# Patient Record
Sex: MALE | Race: WHITE | HISPANIC OR LATINO | Employment: OTHER | URBAN - METROPOLITAN AREA
[De-identification: names, ages, dates, MRNs, and addresses within clinical notes are randomized per-mention and may not be internally consistent; named-entity substitution may affect disease eponyms.]

---

## 2024-07-23 ENCOUNTER — APPOINTMENT (OUTPATIENT)
Dept: RADIOLOGY | Facility: HOSPITAL | Age: 29
End: 2024-07-23

## 2024-07-23 ENCOUNTER — HOSPITAL ENCOUNTER (EMERGENCY)
Facility: HOSPITAL | Age: 29
Discharge: HOME | End: 2024-07-23
Attending: EMERGENCY MEDICINE | Admitting: EMERGENCY MEDICINE

## 2024-07-23 VITALS
OXYGEN SATURATION: 100 % | HEART RATE: 88 BPM | DIASTOLIC BLOOD PRESSURE: 85 MMHG | WEIGHT: 166 LBS | TEMPERATURE: 98.8 F | HEIGHT: 60 IN | RESPIRATION RATE: 16 BRPM | BODY MASS INDEX: 32.59 KG/M2 | SYSTOLIC BLOOD PRESSURE: 133 MMHG

## 2024-07-23 DIAGNOSIS — R07.9 CHEST PAIN: ICD-10-CM

## 2024-07-23 DIAGNOSIS — R00.2 PALPITATIONS: ICD-10-CM

## 2024-07-23 DIAGNOSIS — F11.90 OPIOID USE DISORDER: ICD-10-CM

## 2024-07-23 LAB
ALBUMIN SERPL BCG-MCNC: 4.7 G/DL (ref 3.5–5.2)
ALP SERPL-CCNC: 85 U/L (ref 40–150)
ALT SERPL W P-5'-P-CCNC: 26 U/L (ref 0–70)
AMPHETAMINES UR QL SCN: ABNORMAL
ANION GAP SERPL CALCULATED.3IONS-SCNC: 11 MMOL/L (ref 7–15)
AST SERPL W P-5'-P-CCNC: 14 U/L (ref 0–45)
ATRIAL RATE - MUSE: 83 BPM
BARBITURATES UR QL SCN: ABNORMAL
BENZODIAZ UR QL SCN: ABNORMAL
BILIRUB DIRECT SERPL-MCNC: 0.21 MG/DL (ref 0–0.3)
BILIRUB SERPL-MCNC: 1.2 MG/DL
BUN SERPL-MCNC: 9.5 MG/DL (ref 6–20)
BZE UR QL SCN: ABNORMAL
CALCIUM SERPL-MCNC: 9.7 MG/DL (ref 8.8–10.4)
CANNABINOIDS UR QL SCN: ABNORMAL
CHLORIDE SERPL-SCNC: 102 MMOL/L (ref 98–107)
CREAT SERPL-MCNC: 0.75 MG/DL (ref 0.67–1.17)
DIASTOLIC BLOOD PRESSURE - MUSE: NORMAL MMHG
EGFRCR SERPLBLD CKD-EPI 2021: >90 ML/MIN/1.73M2
ERYTHROCYTE [DISTWIDTH] IN BLOOD BY AUTOMATED COUNT: 12.4 % (ref 10–15)
FENTANYL UR QL: ABNORMAL
FLUAV RNA SPEC QL NAA+PROBE: NEGATIVE
FLUBV RNA RESP QL NAA+PROBE: NEGATIVE
GLUCOSE SERPL-MCNC: 106 MG/DL (ref 70–99)
HCO3 SERPL-SCNC: 28 MMOL/L (ref 22–29)
HCT VFR BLD AUTO: 44.6 % (ref 40–53)
HGB BLD-MCNC: 14.9 G/DL (ref 13.3–17.7)
INTERPRETATION ECG - MUSE: NORMAL
MCH RBC QN AUTO: 28.3 PG (ref 26.5–33)
MCHC RBC AUTO-ENTMCNC: 33.4 G/DL (ref 31.5–36.5)
MCV RBC AUTO: 85 FL (ref 78–100)
OPIATES UR QL SCN: ABNORMAL
P AXIS - MUSE: 57 DEGREES
PCP QUAL URINE (ROCHE): ABNORMAL
PLATELET # BLD AUTO: 357 10E3/UL (ref 150–450)
POTASSIUM SERPL-SCNC: 4 MMOL/L (ref 3.4–5.3)
PR INTERVAL - MUSE: 136 MS
PROT SERPL-MCNC: 8 G/DL (ref 6.4–8.3)
QRS DURATION - MUSE: 102 MS
QT - MUSE: 382 MS
QTC - MUSE: 448 MS
R AXIS - MUSE: 57 DEGREES
RBC # BLD AUTO: 5.27 10E6/UL (ref 4.4–5.9)
RSV RNA SPEC NAA+PROBE: NEGATIVE
SARS-COV-2 RNA RESP QL NAA+PROBE: NEGATIVE
SODIUM SERPL-SCNC: 141 MMOL/L (ref 135–145)
SYSTOLIC BLOOD PRESSURE - MUSE: NORMAL MMHG
T AXIS - MUSE: 45 DEGREES
T4 FREE SERPL-MCNC: 1.16 NG/DL (ref 0.9–1.7)
TROPONIN T SERPL HS-MCNC: <6 NG/L
TSH SERPL DL<=0.005 MIU/L-ACNC: 0.22 UIU/ML (ref 0.3–4.2)
VENTRICULAR RATE- MUSE: 83 BPM
WBC # BLD AUTO: 11.5 10E3/UL (ref 4–11)

## 2024-07-23 PROCEDURE — 71046 X-RAY EXAM CHEST 2 VIEWS: CPT

## 2024-07-23 PROCEDURE — 93005 ELECTROCARDIOGRAM TRACING: CPT | Performed by: EMERGENCY MEDICINE

## 2024-07-23 PROCEDURE — 80307 DRUG TEST PRSMV CHEM ANLYZR: CPT | Performed by: EMERGENCY MEDICINE

## 2024-07-23 PROCEDURE — 87637 SARSCOV2&INF A&B&RSV AMP PRB: CPT | Performed by: EMERGENCY MEDICINE

## 2024-07-23 PROCEDURE — 80048 BASIC METABOLIC PNL TOTAL CA: CPT

## 2024-07-23 PROCEDURE — 82040 ASSAY OF SERUM ALBUMIN: CPT | Performed by: EMERGENCY MEDICINE

## 2024-07-23 PROCEDURE — 99285 EMERGENCY DEPT VISIT HI MDM: CPT | Mod: 25

## 2024-07-23 PROCEDURE — 84439 ASSAY OF FREE THYROXINE: CPT

## 2024-07-23 PROCEDURE — 84443 ASSAY THYROID STIM HORMONE: CPT

## 2024-07-23 PROCEDURE — 85027 COMPLETE CBC AUTOMATED: CPT

## 2024-07-23 PROCEDURE — 36415 COLL VENOUS BLD VENIPUNCTURE: CPT

## 2024-07-23 PROCEDURE — 93005 ELECTROCARDIOGRAM TRACING: CPT | Performed by: STUDENT IN AN ORGANIZED HEALTH CARE EDUCATION/TRAINING PROGRAM

## 2024-07-23 PROCEDURE — 84484 ASSAY OF TROPONIN QUANT: CPT

## 2024-07-23 PROCEDURE — 250N000013 HC RX MED GY IP 250 OP 250 PS 637

## 2024-07-23 RX ORDER — HYDROXYZINE HYDROCHLORIDE 25 MG/1
25 TABLET, FILM COATED ORAL ONCE
Status: COMPLETED | OUTPATIENT
Start: 2024-07-23 | End: 2024-07-23

## 2024-07-23 RX ORDER — ONDANSETRON 4 MG/1
4 TABLET, ORALLY DISINTEGRATING ORAL EVERY 6 HOURS PRN
Qty: 12 TABLET | Refills: 0 | Status: SHIPPED | OUTPATIENT
Start: 2024-07-23

## 2024-07-23 RX ORDER — HYDROXYZINE HYDROCHLORIDE 25 MG/1
25 TABLET, FILM COATED ORAL 3 TIMES DAILY PRN
Qty: 15 TABLET | Refills: 0 | Status: SHIPPED | OUTPATIENT
Start: 2024-07-23

## 2024-07-23 RX ADMIN — HYDROXYZINE HYDROCHLORIDE 25 MG: 25 TABLET, FILM COATED ORAL at 09:21

## 2024-07-23 ASSESSMENT — COLUMBIA-SUICIDE SEVERITY RATING SCALE - C-SSRS
6. HAVE YOU EVER DONE ANYTHING, STARTED TO DO ANYTHING, OR PREPARED TO DO ANYTHING TO END YOUR LIFE?: NO
2. HAVE YOU ACTUALLY HAD ANY THOUGHTS OF KILLING YOURSELF IN THE PAST MONTH?: NO
1. IN THE PAST MONTH, HAVE YOU WISHED YOU WERE DEAD OR WISHED YOU COULD GO TO SLEEP AND NOT WAKE UP?: NO

## 2024-07-23 NOTE — ED TRIAGE NOTES
"Patient here with wife, states 2 days ago having anxiety, palpitations, chest pain. Abusing percocet 30, usually every 2-3 hours \"right now I am trying to wait until I am really, really bad, 4 days ago I probable took 17 pills, every 30 mins\", body aches, fever. Chest pain, will do Covid and EKG. Denies SI, HI      Would like help with withdraw of opiates  "

## 2024-07-23 NOTE — ED PROVIDER NOTES
EMERGENCY DEPARTMENT ENCOUNTER      NAME: Silverio Bhakta  AGE: 28 year old male  YOB: 1995  MRN: [id number]  EVALUATION DATE & TIME: No admission date for patient encounter.    PCP: No primary care provider on file.        Chief Complaint   Patient presents with    Panic Attack    Withdrawal         IMPRESSION  1. Opioid use disorder    2. Chest pain    3. Palpitations        PLAN  - Zofran & Atarax for home  - close PCP follow up  - discharge to home    ED COURSE & MEDICAL DECISION MAKING    ED Course as of 07/23/24 1326   Tue Jul 23, 2024   0950 CXR independently reviewed & interpreted by me: no acute cardiopulmonary process.       9:09 I was consulted by Fernanda Andrew PA-C on this patient. I reviewed ED presentation history, assessment, management, plan to this point. Please see ED JUAN note for details.        Briefly, 28yoM who has been using street Percocet presenting to the ED looking to quit; decreasing use recently with mild resulting palpitations & atypical chest pain. Chest pain workup unremarkable for emergent life-threatening etiology; doubt ACS, PE, acute aortic syndrome, pneumothorax, Boerhaave's, tamponade. Blood tests with no anemia, WBC 11, unremarkable LFTs, no KAVYA, no glaring electrolyte abnormality. UDS with THC & fentanyl; otherwise negative. Suspect mild opiate withdrawal. Ok for outpatient management. Patient comfortable with discharge at this time. Return precautions and need for PCP follow up discussed and understood. No further questions at the time of discharge.        This patient involved a high degree of complexity in medical decision making, as significant risks were present and assessed. Recent encounters & results in medical record reviewed by me.    All workup (i.e. any EKG/labs/imaging as per charting below) reviewed and independently interpreted by me. See respective sections for details.      I had a face to face encounter with this patient seen by the [facility] Provider (JUAN). I personally made/approved the management plan and take responsibility for the patient management. I personally saw patient and performed a substantive portion of the visit including all aspects of the medical decision making.    MEDICATIONS GIVEN IN THE EMERGENCY DEPARTMENT  Medications   hydrOXYzine HCl (ATARAX) tablet 25 mg (25 mg Oral $Given 7/23/24 0921)       NEW PRESCRIPTIONS STARTED AT TODAY'S ER VISIT  Discharge Medication List as of 7/23/2024 12:05 PM        START taking these medications    Details   hydrOXYzine HCl (ATARAX) 25 MG tablet Take 1 tablet (25 mg) by mouth 3 times daily as needed for anxiety, Disp-15 tablet, R-0, Local Print      ondansetron (ZOFRAN ODT) 4 MG ODT tab Take 1 tablet (4 mg) by mouth every 6 hours as needed for nausea or vomiting, Disp-12 tablet, R-0, Local Print                 =================================================================      HPI  Use of : N/A      Silverio Bhakta is a 28 year old male with a pertinent history of opiate use who presents to the ED by car for evaluation of chest pain, palpitations, withdrawal.  Patient reports he has been using Percocet over the last few months.  He is buying this off the street.  He has been using several pills a day.  Notes increased use over the weekend when he was taking 5 to 6 pills a day.  2 days ago on Sunday he noticed onset of central chest pain, palpitations, and anxiety.  Yesterday noticed body aches.  Denies abdominal pain, nausea, vomiting, diarrhea, fever, cough.  States that he also used marijuana which she uses only occasionally on Sunday when the symptoms started.  No history of anxiety.  He called around to different detox centers, but he does not have medical insurance so was unable to get a spot anywhere.      --------------- MEDICAL HISTORY ---------------  PAST MEDICAL HISTORY:  Reviewed by me.  No past medical history on file.  There is no problem list on file for this  patient.      PAST SURGICAL HISTORY:  Reviewed by me.  No past surgical history on file.    CURRENT MEDICATIONS:    Reviewed by me.  No current facility-administered medications for this encounter.    Current Outpatient Medications:     hydrOXYzine HCl (ATARAX) 25 MG tablet, Take 1 tablet (25 mg) by mouth 3 times daily as needed for anxiety, Disp: 15 tablet, Rfl: 0    ondansetron (ZOFRAN ODT) 4 MG ODT tab, Take 1 tablet (4 mg) by mouth every 6 hours as needed for nausea or vomiting, Disp: 12 tablet, Rfl: 0    ALLERGIES:  Reviewed by me.  No Known Allergies    FAMILY HISTORY:  Reviewed by me.  No family history on file.    SOCIAL HISTORY:   Reviewed by me.         --------------- PHYSICAL EXAM ---------------  Nursing notes and vitals independently reviewed by me.  VITALS:  Vitals:    07/23/24 0841 07/23/24 1037 07/23/24 1205   BP: (!) 145/86 130/79 133/85   Pulse: 99 84 88   Resp: 20 16    Temp: 98.8  F (37.1  C)     SpO2: 97% 100% 100%   Weight: 75.3 kg (166 lb)     Height: 1.524 m (5')         PHYSICAL EXAM:    General:  alert, interactive, no distress  Eyes:  conjunctivae clear, conjugate gaze  HENT:  atraumatic, nose with no rhinorrhea, oropharynx clear  Neck:  no meningismus  Cardiovascular:  HR 80s during exam, regular rhythm, no murmurs, brisk cap refill  Chest:  no chest wall tenderness  Pulmonary:  no stridor, normal phonation, normal work of breathing, clear lungs bilaterally  Abdomen: soft, nondistended, nontender  :  no CVA tenderness  Back:  no midline spinal tenderness  Musculoskeletal:  no pretibial edema, no calf tenderness. Gross ROM intact to joints of extremities with no obvious deformities.  Skin:  warm, dry, no rash  Neuro:  awake, alert, answers questions appropriately, follows commands, moves all limbs  Psych:  calm, normal affect      --------------- RESULTS ---------------  EKG:    Reviewed and independently interpreted by me.  - NSR at 83bpm, no ST or T wave changes, normal intervals  -  no priors for comparison  My read.    LAB:  Reviewed and independently interpreted by me.  Results for orders placed or performed during the hospital encounter of 07/23/24   Chest XR,  PA & LAT    Impression    IMPRESSION: Normal cardiac and mediastinal contours. The lungs are symmetrically inflated and are clear. No pleural effusion or pneumothorax.     Upper abdomen is unremarkable.     CONCLUSION:   Normal chest.    Symptomatic Influenza A/B, RSV, & SARS-CoV2 PCR (COVID-19) Nasopharyngeal    Specimen: Nasopharyngeal; Swab   Result Value Ref Range    Influenza A PCR Negative Negative    Influenza B PCR Negative Negative    RSV PCR Negative Negative    SARS CoV2 PCR Negative Negative   CBC with platelets   Result Value Ref Range    WBC Count 11.5 (H) 4.0 - 11.0 10e3/uL    RBC Count 5.27 4.40 - 5.90 10e6/uL    Hemoglobin 14.9 13.3 - 17.7 g/dL    Hematocrit 44.6 40.0 - 53.0 %    MCV 85 78 - 100 fL    MCH 28.3 26.5 - 33.0 pg    MCHC 33.4 31.5 - 36.5 g/dL    RDW 12.4 10.0 - 15.0 %    Platelet Count 357 150 - 450 10e3/uL   Basic metabolic panel   Result Value Ref Range    Sodium 141 135 - 145 mmol/L    Potassium 4.0 3.4 - 5.3 mmol/L    Chloride 102 98 - 107 mmol/L    Carbon Dioxide (CO2) 28 22 - 29 mmol/L    Anion Gap 11 7 - 15 mmol/L    Urea Nitrogen 9.5 6.0 - 20.0 mg/dL    Creatinine 0.75 0.67 - 1.17 mg/dL    GFR Estimate >90 >60 mL/min/1.73m2    Calcium 9.7 8.8 - 10.4 mg/dL    Glucose 106 (H) 70 - 99 mg/dL   Result Value Ref Range    Troponin T, High Sensitivity <6 <=22 ng/L   TSH with free T4 reflex   Result Value Ref Range    TSH 0.22 (L) 0.30 - 4.20 uIU/mL   Hepatic function panel   Result Value Ref Range    Protein Total 8.0 6.4 - 8.3 g/dL    Albumin 4.7 3.5 - 5.2 g/dL    Bilirubin Total 1.2 <=1.2 mg/dL    Alkaline Phosphatase 85 40 - 150 U/L    AST 14 0 - 45 U/L    ALT 26 0 - 70 U/L    Bilirubin Direct 0.21 0.00 - 0.30 mg/dL   Result Value Ref Range    Free T4 1.16 0.90 - 1.70 ng/dL   Urine Drug Screen Panel    Result Value Ref Range    Amphetamines Urine Screen Negative Screen Negative    Barbituates Urine Screen Negative Screen Negative    Benzodiazepine Urine Screen Negative Screen Negative    Cannabinoids Urine Screen Positive (A) Screen Negative    Cocaine Urine Screen Negative Screen Negative    Fentanyl Qual Urine Screen Positive (A) Screen Negative    Opiates Urine Screen Negative Screen Negative    PCP Urine Screen Negative Screen Negative       RADIOLOGY:  Reviewed and independently interpreted by me. Please see official radiology report.  Recent Results (from the past 24 hour(s))   Chest XR,  PA & LAT    Narrative    EXAM: XR CHEST 2 VIEWS  LOCATION: [facility]  DATE: 7/23/2024    INDICATION: chest pain  COMPARISON: None.      Impression    IMPRESSION: Normal cardiac and mediastinal contours. The lungs are symmetrically inflated and are clear. No pleural effusion or pneumothorax.     Upper abdomen is unremarkable.     CONCLUSION:   Normal chest.                    --------------- ADDITIONAL MDM ---------------  History:  - I considered systemic symptoms of the presenting illness.  - Supplemental history from:       -- patient  - External Record(s) reviewed:       -- Inpatient/outpatient record, prior labs, prior imaging       -- see above ED course & MDM for further details    Workup:  - Chart documentation above includes differential considered and any EKGs or imaging independently interpreted by provider.  - emergent/severe conditions considered and evaluated for: see above differential & MDM  - In additional to work up documented, I considered the following work up:       -- CTA chest/abdomen/pelvis       -- see above ED course & MDM for further details    External Consultation:  - Discussion of management with another provider:       -- ED pharmacist re: meds       -- see above charting for additional    Complicating Factors:  - Care impacted by chronic illness:       -- see above  MDM, past medical history, & problem list  - Care affected by social determinants of health:       -- see above social history       -- Access to Affordable Healthcare       -- Access to Medical Care       -- Alcohol Abuse and/or Recreational Drug Use    Disposition Considerations:  - Discharge       -- I considered escalation of care with admission to the hospital, but ultimately discharged the patient per decision making above       -- I recommended the patient continue their current prescription strength medication(s) as charted above in current medications list       -- I prescribed prescription strength medication(s) as charted above       -- I recommended over-the-counter medication(s) as charted above & in discharge instructions           I, Anibal Gupta, am serving as a scribe to document services personally performed by Dr. Miguel Littlejohn based on my observation and the provider's statements to me. I, Miguel Littlejohn MD attest that Anibal Gupta is acting in a scribe capacity, has observed my performance of the services and has documented them in accordance with my direction.      Miguel Littlejohn MD  07/23/24  Emergency Medicine  [facility] EMERGENCY DEPARTMENT  [address] [place] [id number]  [phone]  Dept: [phone]      Miguel Littlejohn MD  07/23/24 9360

## 2024-07-23 NOTE — ED PROVIDER NOTES
Emergency Department Encounter   NAME: Silverio Bhakta  AGE: 28 year old male  YOB: 1995  MRN: [id number]    PCP: No primary care provider on file.  ED PROVIDER: Fernanda Andrew PA-C    Evaluation Date & Time:   No admission date for patient encounter.    CHIEF COMPLAINT:  Panic Attack and Withdrawal      Impression and Plan   MDM: 28-year-old male with no pertinent past medical history presents for evaluation for chest pain, palpitations, withdrawal. Patient notes using Percocet he bought on the street for the last few months. Would like to stop, but doesn't have insurance and he was declined from multiple detox facilities that he reached out to.  Has had ongoing chest pain, palpitations, and bodyaches over the last 2 days.  He is never had this before.  States it feels like anxiety, but he is concerned because of the chest pain.  No abdominal pain, nausea, vomiting, diarrhea.  On arrival here patient is slightly hypertensive, otherwise vitally stable.  Afebrile.  On my examination patient is in no acute distress.  Unremarkable cardiac and pulmonary exams.  Abdomen is soft and nontender.  We discussed the course of opioid withdrawal and symptomatic management.  Differential includes opioid withdrawal, anxiety, ACS, electrolyte abnormality, arrhythmia, viral syndrome, thyroid abnormality.  Given this presentation, low suspicion for acute aortic pathology or PE. We discussed plan for symptomatic management and lab work here which patient is agreeable to.  Hydroxyzine for anxiety at this time.  Patient denies the need for any pain or nausea medicines.    Reassuring lab work up here.  Mild leukocytosis at 11.5, but with no fever or vital abnormalities I do not think that this represents a systemic illness.  Do not think any further workup is needed for this here in the emergency department.  No anemia.  No electrolyte abnormality or KAVYA. Normal LFTs. Low TSH at 0.22, but normal free T4. EKG here without  signs of ischemia, pericarditis, or arrhythmia.  Troponin is under 6 and given patient's symptoms have been constant for multiple days, ACS is unlikely at this time.  Negative COVID swab.  Urine drug screen is positive for fentanyl and cannabinoids.  Chest x-ray per my independent interpretation without any consolidation concerning for pneumonia, pneumothorax, pleural effusion, cardiomegaly.  Supported by radiologist read.    I reassessed the patient.  He is feeling better after hydroxyzine here -chest pain and palpitations have resolved.  We discussed all lab findings.  It does seem that his symptoms may be from a reduction in his drug use vs possible viral syndrome with his bodyaches.  I supported his efforts in maintaining sobriety.  We discussed symptomatic management for opioid withdrawal.  I prescribed him hydroxyzine and Zofran to use as needed for his withdrawal symptoms.  We reviewed use and side effects of these medications.  I provided him with chemical dependency resources to help him in this process.  I also referred him to our financial  here at [place] as he has had difficulty obtaining health insurance.  I referred him to primary care as well.  I encouraged close outpatient follow-up which the patient is agreeable to.  We reviewed strict return precautions and patient is discharged home in stable condition.      I have staffed the patient with Dr. Littlejohn, ED physician, who will evaluate the patient and agrees with all aspects of today's care.     ED Course as of 07/23/24 1535   Tue Jul 23, 2024   0950 CXR independently reviewed & interpreted by me: no acute cardiopulmonary process.       Medical Decision Making  Obtained supplemental history:Supplemental history obtained?: No  Reviewed external records: External records reviewed?: No  Care impacted by chronic illness:Other: Opioid use disorder  Care significantly affected by social determinants of health:N/A  Did you consider but not  order tests?: Work up considered but not performed and documented in chart, if applicable  Did you interpret images independently?: Independent interpretation of ECG and images noted in documentation, when applicable.  Consultation discussion with other provider:Did you involve another provider (consultant, , pharmacy, etc.)?: No  Discharge. I prescribed additional prescription strength medication(s) as charted. N/A.   At the conclusion of the encounter I discussed the results of all the tests and the disposition. The questions were answered. The patient or family acknowledged understanding and was agreeable with the care plan.    FINAL IMPRESSION:    ICD-10-CM    1. Opioid use disorder  F11.90 Financial Resource Worker Referral     Primary Care Referral      2. Chest pain  R07.9 Financial Resource Worker Referral     Primary Care Referral      3. Palpitations  R00.2 Financial Resource Worker Referral     Primary Care Referral            MEDICATIONS GIVEN IN THE EMERGENCY DEPARTMENT:  Medications   hydrOXYzine HCl (ATARAX) tablet 25 mg (25 mg Oral $Given 7/23/24 0921)         NEW PRESCRIPTIONS STARTED AT TODAY'S ED VISIT:  Discharge Medication List as of 7/23/2024 12:05 PM        START taking these medications    Details   hydrOXYzine HCl (ATARAX) 25 MG tablet Take 1 tablet (25 mg) by mouth 3 times daily as needed for anxiety, Disp-15 tablet, R-0, Local Print      ondansetron (ZOFRAN ODT) 4 MG ODT tab Take 1 tablet (4 mg) by mouth every 6 hours as needed for nausea or vomiting, Disp-12 tablet, R-0, Local Print               HPI   Patient information was obtained from: patient   Use of Intrepreter: N/A     Silverio Bhakta is a 28 year old male with no pertinent history who presents to the ED by car for evaluation of chest pain, palpitations, withdrawal.  Patient reports he has been using Percocet over the last few months.  He is buying this off the street.  He has been using several pills a day.  Notes increased  use over the weekend when he was taking 5 to 6 pills a day.  2 days ago on Sunday he noticed onset of central chest pain, palpitations, and anxiety.  Yesterday noticed body aches.  Denies abdominal pain, nausea, vomiting, diarrhea, fever, cough.  States that he also used marijuana which she uses only occasionally on Sunday when the symptoms started.  No history of anxiety.  He called around to different detox centers, but he does not have medical insurance so was unable to get a spot anywhere.  Took 1 Percocet at about 6 this morning.      REVIEW OF SYSTEMS:  Pertinent positive and negative symptoms per HPI.       Medical History     No past medical history on file.    No past surgical history on file.    No family history on file.         hydrOXYzine HCl (ATARAX) 25 MG tablet  ondansetron (ZOFRAN ODT) 4 MG ODT tab          Physical Exam     First Vitals:  Patient Vitals for the past 24 hrs:   BP Temp Pulse Resp SpO2 Height Weight   07/23/24 1205 133/85 -- 88 -- 100 % -- --   07/23/24 1037 130/79 -- 84 16 100 % -- --   07/23/24 0841 (!) 145/86 98.8  F (37.1  C) 99 20 97 % 1.524 m (5') 75.3 kg (166 lb)       PHYSICAL EXAM:   General Appearance:  Alert, cooperative, no distress, appears stated age  HENT: Normocephalic without obvious deformity, atraumatic. Mucous membranes moist   Eyes: Conjunctiva clear, Lids normal. No discharge.   Respiratory: No distress. Lungs clear to ausculation bilaterally. No wheezes, rhonchi or stridor  Cardiovascular: Regular rate and rhythm, no murmur. Normal cap refill. No peripheral edema  GI: Abdomen soft, nontender, normal bowel sounds  Musculoskeletal: Moving all extremities. No gross deformities  Integument: Warm, dry, no rashes or lesions  Neurologic: Alert and orientated x3. No focal deficits. 5/5 upper and lower extremity strength.   Psych: Normal mood and affect      Results     LAB:  All pertinent labs reviewed and interpreted  Labs Ordered and Resulted from Time of ED Arrival  to Time of ED Departure   CBC WITH PLATELETS - Abnormal       Result Value    WBC Count 11.5 (*)     RBC Count 5.27      Hemoglobin 14.9      Hematocrit 44.6      MCV 85      MCH 28.3      MCHC 33.4      RDW 12.4      Platelet Count 357     BASIC METABOLIC PANEL - Abnormal    Sodium 141      Potassium 4.0      Chloride 102      Carbon Dioxide (CO2) 28      Anion Gap 11      Urea Nitrogen 9.5      Creatinine 0.75      GFR Estimate >90      Calcium 9.7      Glucose 106 (*)    TSH WITH FREE T4 REFLEX - Abnormal    TSH 0.22 (*)    URINE DRUG SCREEN PANEL - Abnormal    Amphetamines Urine Screen Negative      Barbituates Urine Screen Negative      Benzodiazepine Urine Screen Negative      Cannabinoids Urine Screen Positive (*)     Cocaine Urine Screen Negative      Fentanyl Qual Urine Screen Positive (*)     Opiates Urine Screen Negative      PCP Urine Screen Negative     INFLUENZA A/B, RSV, & SARS-COV2 PCR - Normal    Influenza A PCR Negative      Influenza B PCR Negative      RSV PCR Negative      SARS CoV2 PCR Negative     TROPONIN T, HIGH SENSITIVITY - Normal    Troponin T, High Sensitivity <6     HEPATIC FUNCTION PANEL - Normal    Protein Total 8.0      Albumin 4.7      Bilirubin Total 1.2      Alkaline Phosphatase 85      AST 14      ALT 26      Bilirubin Direct 0.21     T4 FREE - Normal    Free T4 1.16         RADIOLOGY:  Chest XR,  PA & LAT   Final Result   IMPRESSION: Normal cardiac and mediastinal contours. The lungs are symmetrically inflated and are clear. No pleural effusion or pneumothorax.       Upper abdomen is unremarkable.       CONCLUSION:    Normal chest.             Fernanda Andrew PA-C   Emergency Medicine   [facility] EMERGENCY DEPARTMENT       Fernanda Andrew PA-C  07/23/24 5073

## 2024-07-26 ENCOUNTER — PATIENT OUTREACH (OUTPATIENT)
Dept: CARE COORDINATION | Facility: CLINIC | Age: 29
End: 2024-07-26

## 2024-07-26 ENCOUNTER — APPOINTMENT (OUTPATIENT)
Dept: ADMINISTRATIVE | Facility: CLINIC | Age: 29
End: 2024-07-26

## 2024-08-05 ENCOUNTER — PATIENT OUTREACH (OUTPATIENT)
Dept: CARE COORDINATION | Facility: CLINIC | Age: 29
End: 2024-08-05

## 2024-08-06 ENCOUNTER — PATIENT OUTREACH (OUTPATIENT)
Dept: CARE COORDINATION | Facility: CLINIC | Age: 29
End: 2024-08-06

## 2024-09-10 ENCOUNTER — PATIENT OUTREACH (OUTPATIENT)
Dept: CARE COORDINATION | Facility: CLINIC | Age: 29
End: 2024-09-10

## 2024-09-17 ENCOUNTER — PATIENT OUTREACH (OUTPATIENT)
Dept: CARE COORDINATION | Facility: CLINIC | Age: 29
End: 2024-09-17